# Patient Record
Sex: FEMALE | Race: WHITE | URBAN - METROPOLITAN AREA
[De-identification: names, ages, dates, MRNs, and addresses within clinical notes are randomized per-mention and may not be internally consistent; named-entity substitution may affect disease eponyms.]

---

## 2018-06-20 ENCOUNTER — OFFICE VISIT (OUTPATIENT)
Dept: URGENT CARE | Facility: URGENT CARE | Age: 28
End: 2018-06-20
Payer: OTHER MISCELLANEOUS

## 2018-06-20 VITALS
HEART RATE: 67 BPM | SYSTOLIC BLOOD PRESSURE: 119 MMHG | DIASTOLIC BLOOD PRESSURE: 82 MMHG | TEMPERATURE: 97.6 F | OXYGEN SATURATION: 100 % | WEIGHT: 132.4 LBS

## 2018-06-20 DIAGNOSIS — S61.211A LACERATION OF LEFT INDEX FINGER WITHOUT FOREIGN BODY WITHOUT DAMAGE TO NAIL, INITIAL ENCOUNTER: Primary | ICD-10-CM

## 2018-06-20 PROCEDURE — 12001 RPR S/N/AX/GEN/TRNK 2.5CM/<: CPT | Performed by: PHYSICIAN ASSISTANT

## 2018-06-20 NOTE — LETTER
[address] [address]  [phone]        2018    REPORT OF WORK ABILITY    PATIENT DATA  Employee Name: Jose L Sutton        : 1990   [id number]  Work related injury: YES  Today's date: 2018  Date of injury: 2018     PROVIDER EVALUATION: Please fill in as needed.  Please give copy to employee for employer.  1. Diagnosis: Laceration  2. Treatment: 3 sutures placed  3. Medication: none  NOTE: When ordering a medication, [place] Rules require Work Comp or WC on prescriptions.  4. Return to work date: 18 with the following: * Dry, clean job  DURATION OF LIMITATIONS: until sutures are removed.        RESTRICTIONS: Unlimited unless listed.  Restrictions apply to home and leisure also.  If work within restrictions is not available, the employee is totally disabled.  Provider comments: suture removal in 10-12 days.  Local wound care daily  Medical Examiner: Lata Moser      License or registration: [id number]    Next appointment: suture removal in 10-12 days    CC: Employer, Managed Care Plan/Payor, Patient

## 2018-06-20 NOTE — MR AVS SNAPSHOT
After Visit Summary   6/20/2018    Jose L Sutton    MRN: [id number]           Patient Information     Date Of Birth          1990        Visit Information        Provider Department      6/20/2018 6:50 PM Lata Knight PA-C [facility]        Today's Diagnoses     Laceration of left index finger without foreign body without damage to nail, initial encounter    -  1       Follow-ups after your visit        Who to contact     If you have questions or need follow up information about today's clinic visit or your schedule please contact [facility] directly at [phone].  Normal or non-critical lab and imaging results will be communicated to you by MyChart, letter or phone within 4 business days after the clinic has received the results. If you do not hear from us within 7 days, please contact the clinic through MyChart or phone. If you have a critical or abnormal lab result, we will notify you by phone as soon as possible.  Submit refill requests through [employer] or call your pharmacy and they will forward the refill request to us. Please allow 3 business days for your refill to be completed.          Additional Information About Your Visit        Care EveryWhere ID     This is your Care EveryWhere ID. This could be used by other organizations to access your [facility] medical records  [id number]        Your Vitals Were     Pulse Temperature Last Period Pulse Oximetry          67 97.6  F (36.4  C) (Oral) 06/01/2018 (Approximate) 100%         Blood Pressure from Last 3 Encounters:   06/20/18 119/82    Weight from Last 3 Encounters:   06/20/18 132 lb 6.4 oz (60.1 kg)              We Performed the Following     REPAIR SUPERFICIAL, WOUND BODY < =2.5CM        Primary Care Provider Fax #    Physician No Ref-Primary [phone]       No address on file        Equal Access to Services     SUSIE HALEY AH: Nuha martin  Huan, tyler vicentestephaniaha, thierry kaarthur segundo, jason lorenain hayaan arnaldokentrell cristymigel lasonalidean escobar. So [facility] [phone].    ATENCIÓN: Si habla español, tiene a ghotra disposición servicios gratuitos de asistencia lingüística. Aristeoame al [phone].    We comply with applicable federal civil rights laws and [place] laws. We do not discriminate on the basis of race, color, national origin, age, disability, sex, sexual orientation, or gender identity.            Thank you!     Thank you for choosing [facility]  for your care. Our goal is always to provide you with excellent care. Hearing back from our patients is one way we can continue to improve our services. Please take a few minutes to complete the written survey that you may receive in the mail after your visit with us. Thank you!             Your Updated Medication List - Protect others around you: Learn how to safely use, store and throw away your medicines at www.disposemymeds.org.      Notice  As of 6/20/2018  8:00 PM    You have not been prescribed any medications.

## 2018-06-21 NOTE — PROGRESS NOTES
SUBJECTIVE:     Chief Complaint   Patient presents with     Finger     cut LT pointer finger with scissors while at work, happened about 2 hours ago     Urgent Care     Jose L Sutton is a 27 year old female who presents to the clinic with a laceration on the left second finger sustained just prior to arrival in clinic.    This is a work related injury.    Mechanism of injury: scissors.    Associated symptoms: Denies numbness, weakness, or loss of function  Last tetanus booster within 10 years: yes    EXAM:   The patient appears today in alert,no apparent distress distress  VITALS: /82  Pulse 67  Temp 97.6  F (36.4  C) (Oral)  Wt 132 lb 6.4 oz (60.1 kg)  LMP 06/01/2018 (Approximate)  SpO2 100%    Size of laceration: 1.5 centimeters  Characteristics of the laceration: active bleeding and extends into subcutaneous fat  Tendon function intact: yes  Sensation to light touch intact: yes  Pulses intact: yes  Picture included in patient's chart: no    Assessment:  Finger laceration    PLAN:  PROCEDURE NOTE::  Wound was locally injected with 1 cc's of Lidocaine 1% plain  Prepped and draped in the usual sterile fashion  Wound cleaned with betadine/saline solution  Wound soaked  Wound irrigated  Laceration was closed using 3 5-0 nylon interrupted sutures  After care instructions:  Keep wound clean and dry for the next 24-48 hours  Sutures out in 10 - 12 days  Signs of infection discussed today  Apply anti-bacterial ointment for 10 days  May return to work as long as wound is kept clean and dry    Patient expresses understanding and agreement with the assessment and plan as above.

## 2018-07-01 ENCOUNTER — OFFICE VISIT (OUTPATIENT)
Dept: URGENT CARE | Facility: URGENT CARE | Age: 28
End: 2018-07-01
Payer: OTHER MISCELLANEOUS

## 2018-07-01 VITALS — TEMPERATURE: 98.7 F

## 2018-07-01 DIAGNOSIS — Z53.9 DIAGNOSIS NOT YET DEFINED: Primary | ICD-10-CM

## 2018-07-01 PROCEDURE — 99024 POSTOP FOLLOW-UP VISIT: CPT

## 2018-07-01 NOTE — MR AVS SNAPSHOT
After Visit Summary   7/1/2018    Jose L Sutton    MRN: [id number]           Patient Information     Date Of Birth          1990        Visit Information        Provider Department      7/1/2018 5:35 PM Provider, Elliott Emmanuel MD [facility]        Today's Diagnoses     DIAGNOSIS NOT YET DEFINED    -  1       Follow-ups after your visit        Who to contact     If you have questions or need follow up information about today's clinic visit or your schedule please contact [facility] directly at [phone].  Normal or non-critical lab and imaging results will be communicated to you by MyChart, letter or phone within 4 business days after the clinic has received the results. If you do not hear from us within 7 days, please contact the clinic through MyChart or phone. If you have a critical or abnormal lab result, we will notify you by phone as soon as possible.  Submit refill requests through [employer] or call your pharmacy and they will forward the refill request to us. Please allow 3 business days for your refill to be completed.          Additional Information About Your Visit        Care EveryWhere ID     This is your Care EveryWhere ID. This could be used by other organizations to access your [facility] medical records  [id number]        Your Vitals Were     Temperature Last Period                98.7  F (37.1  C) (Oral) 06/01/2018 (Approximate)           Blood Pressure from Last 3 Encounters:   06/20/18 119/82    Weight from Last 3 Encounters:   06/20/18 132 lb 6.4 oz (60.1 kg)              Today, you had the following     No orders found for display       Primary Care Provider Fax #    Physician No Ref-Primary [phone]       No address on file        Equal Access to Services     SUSIE HALEY : Nuha Pressley, tyler luclifton, qajason kingsley . So [facility]  [phone].    ATENCIÓN: Si habla marilyn, tiene a ghotra disposición servicios gratuitos de asistencia lingüística. Scott al [phone].    We comply with applicable federal civil rights laws and [place] laws. We do not discriminate on the basis of race, color, national origin, age, disability, sex, sexual orientation, or gender identity.            Thank you!     Thank you for choosing [facility]  for your care. Our goal is always to provide you with excellent care. Hearing back from our patients is one way we can continue to improve our services. Please take a few minutes to complete the written survey that you may receive in the mail after your visit with us. Thank you!             Your Updated Medication List - Protect others around you: Learn how to safely use, store and throw away your medicines at www.disposemymeds.org.      Notice  As of 7/1/2018  7:10 PM    You have not been prescribed any medications.

## 2018-07-02 NOTE — NURSING NOTE
Jose L Sutton presents to the clinic for removal of sutures and sutures,staples, steri strips. The patient has had sutures in place for 10 days. There has been no patient reported signs or symptoms of infection or drainage. 3  sutures and sutures,staples, staple, steri strips are seen and located on the left hand index finger. Tetanus status is up to date. All sutures and sutures,staples, steri strips were easily removed today. Routine wound care discussed by the RN or provider. The patient will follow up as needed.